# Patient Record
Sex: FEMALE | Race: WHITE | NOT HISPANIC OR LATINO | Employment: STUDENT | URBAN - METROPOLITAN AREA
[De-identification: names, ages, dates, MRNs, and addresses within clinical notes are randomized per-mention and may not be internally consistent; named-entity substitution may affect disease eponyms.]

---
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
## 2024-03-29 PROBLEM — R00.1 BRADYCARDIA: Status: ACTIVE | Noted: 2024-03-29

## 2024-03-29 PROBLEM — R55 NEAR SYNCOPE: Status: ACTIVE | Noted: 2024-03-29

## 2024-10-01 ENCOUNTER — OFFICE VISIT (OUTPATIENT)
Dept: URGENT CARE | Facility: CLINIC | Age: 19
End: 2024-10-01
Payer: OTHER GOVERNMENT

## 2024-10-01 VITALS
HEART RATE: 77 BPM | BODY MASS INDEX: 21.29 KG/M2 | TEMPERATURE: 98 F | WEIGHT: 127.75 LBS | OXYGEN SATURATION: 100 % | DIASTOLIC BLOOD PRESSURE: 82 MMHG | SYSTOLIC BLOOD PRESSURE: 100 MMHG | HEIGHT: 65 IN

## 2024-10-01 DIAGNOSIS — R09.81 NASAL CONGESTION: ICD-10-CM

## 2024-10-01 DIAGNOSIS — R11.2 NAUSEA AND VOMITING, UNSPECIFIED VOMITING TYPE: ICD-10-CM

## 2024-10-01 DIAGNOSIS — B34.9 VIRAL SYNDROME: Primary | ICD-10-CM

## 2024-10-01 DIAGNOSIS — R05.9 COUGH, UNSPECIFIED TYPE: ICD-10-CM

## 2024-10-01 LAB
CTP QC/QA: YES
CTP QC/QA: YES
POC MOLECULAR INFLUENZA A AGN: NEGATIVE
POC MOLECULAR INFLUENZA B AGN: NEGATIVE
SARS-COV-2 AG RESP QL IA.RAPID: NEGATIVE

## 2024-10-01 PROCEDURE — 87811 SARS-COV-2 COVID19 W/OPTIC: CPT | Mod: QW,S$GLB,,

## 2024-10-01 PROCEDURE — 87502 INFLUENZA DNA AMP PROBE: CPT | Mod: QW,S$GLB,,

## 2024-10-01 PROCEDURE — 99203 OFFICE O/P NEW LOW 30 MIN: CPT | Mod: S$GLB,,,

## 2024-10-01 RX ORDER — FLUTICASONE PROPIONATE 50 MCG
1 SPRAY, SUSPENSION (ML) NASAL DAILY
Qty: 9.9 ML | Refills: 0 | Status: SHIPPED | OUTPATIENT
Start: 2024-10-01

## 2024-10-01 RX ORDER — BENZONATATE 200 MG/1
200 CAPSULE ORAL 3 TIMES DAILY PRN
Qty: 30 CAPSULE | Refills: 0 | Status: SHIPPED | OUTPATIENT
Start: 2024-10-01

## 2024-10-01 RX ORDER — ZALEPLON 10 MG/1
10 CAPSULE ORAL NIGHTLY
COMMUNITY

## 2024-10-01 NOTE — PATIENT INSTRUCTIONS
PLEASE READ YOUR DISCHARGE INSTRUCTIONS ENTIRELY AS IT CONTAINS IMPORTANT INFORMATION.    Please drink plenty of fluids.    Please get plenty of rest.    Please return here or go to the Emergency Department for any concerns or worsening of condition.    Please take an over the counter antihistamine medication (Allegra/Claritin/Zyrtec) of your choice as directed for allergy symptoms and/or runny nose and postnasal drip.    Try an over the counter decongestant for sinus pressure/ear pressure, congestion symptoms like Mucinex D or Sudafed or Phenylephrine. You buy this behind the pharmacy counter.    If you do have Hypertension or palpitations, it is safe to take Coricidin HBP for relief of sinus symptoms.    Tylenol or ibuprofen can also be used as directed for pain and fever unless you have an allergy to them or medical condition such as stomach ulcers, kidney or liver disease or blood thinners etc for which you should not be taking these type of medications.     Sore throat recommendations: Warm fluids, warm salt water gargles, throat lozenges, tea, honey, soup, rest, hydration.    Use over the counter Flonase or Nasocort: one spray each nostril twice daily OR two sprays each nostril once daily until nares dry out, unless you have Glaucoma.   Can also supplement with nasal saline rinse.    Sinus rinses DO NOT USE TAP WATER, if you must, water must be at a rolling boil for 1 minute, let it cool, then use.  May use distilled water, or over the counter nasal saline rinses.  Kofi's vapor rub in shower to help open nasal passages.  May use nasal gel to keep passages moisturized.  May use nasal saline sprays during the day for added relief of congestion.   For those who go to the gym, please do not use the sauna or steam room now to clear sinuses.    Cough     Rest and fluids are important  Can use honey with manpreet to soothe your throat    Robitussin or Delsyum for cough suppressant for dry cough.    Mucinex DM or  products containing Guaifenesin or Dextromethorphan for expectorant (wet cough).    Take prescription cough meds (pills) as prescribed; take prescription cough syrup at night as needed for cough.  Do not take both the prescribed cough pills and syrup at the same time or within 6 hours of each other.  Do not take the cough syrup with any other sedative medication as it can can cause drowsiness. Do not operate any heavy machinery, drink or drive while taking the cough syrup.     Please follow up with your primary care doctor or specialist in the next 48-72hrs as needed and if no improvement    If you smoke, please stop smoking.    Please return or see your primary care doctor if you develop new or worsening symptoms.     Lastly, good hand washing and cough hygiene (cough into your elbow) will help prevent the spread of the illness. A general rule is that you are no longer contagious once you have been without a fever for over 24 hours without requiring fever reducing medications.     Please arrange follow up with your primary medical clinic as soon as possible. You must understand that you've received an Urgent Care treatment only and that you may be released before all of your medical problems are known or treated. You, the patient, will arrange for follow up as instructed. If your symptoms worsen or fail to improve you should go to the Emergency Room.

## 2024-10-01 NOTE — PROGRESS NOTES
"Subjective:      Patient ID: Erika Charles is a 19 y.o. female.    Vitals:  height is 5' 5" (1.651 m) and weight is 58 kg (127 lb 12.1 oz). Her temperature is 98 °F (36.7 °C). Her blood pressure is 100/82 and her pulse is 77. Her oxygen saturation is 100%.     Chief Complaint: Cough    Erika Charles is a 19 y.o. female who presents for URI sxs which onset 2 days ago. Associated sxs include chills, congestion, cough, subjective fever, HA, and emesis. Patient denies any SOB, CP, abd pain, v/d, rash, dizziness, or numbness/tingling. No tx tried.     Other  This is a new problem. The current episode started in the past 7 days (Onset Sunday). The problem occurs constantly. The problem has been gradually worsening. Associated symptoms include chills, congestion (nasal congestion), coughing (dry at first, and now productive), fatigue, a fever (felt feverish), headaches, nausea and vomiting. Pertinent negatives include no abdominal pain, change in bowel habit, chest pain, diaphoresis, myalgias, numbness, sore throat or swollen glands. She has tried nothing for the symptoms. The treatment provided no relief.       Constitution: Positive for chills, fatigue and fever (felt feverish). Negative for appetite change and sweating.   HENT:  Positive for congestion (nasal congestion). Negative for ear pain, ear discharge, hearing loss, postnasal drip, sinus pain, sinus pressure, sore throat and trouble swallowing.    Cardiovascular:  Negative for chest pain.   Respiratory:  Positive for cough (dry at first, and now productive). Negative for sputum production and shortness of breath.    Gastrointestinal:  Positive for nausea and vomiting. Negative for abdominal pain and diarrhea.   Musculoskeletal:  Negative for muscle ache.   Neurological:  Positive for headaches. Negative for dizziness, numbness and tingling.      Objective:     Physical Exam   Constitutional: She is oriented to person, place, and time. She appears well-developed. " She is cooperative.  Non-toxic appearance. She does not appear ill. No distress.   HENT:   Head: Normocephalic and atraumatic.   Ears:   Right Ear: Hearing, tympanic membrane, external ear and ear canal normal.   Left Ear: Hearing, tympanic membrane, external ear and ear canal normal.   Nose: Nose normal. No mucosal edema or nasal deformity. No epistaxis. Right sinus exhibits no maxillary sinus tenderness and no frontal sinus tenderness. Left sinus exhibits no maxillary sinus tenderness and no frontal sinus tenderness.   Mouth/Throat: Uvula is midline, oropharynx is clear and moist and mucous membranes are normal. Mucous membranes are moist. No trismus in the jaw. Normal dentition. No uvula swelling. No oropharyngeal exudate, posterior oropharyngeal edema or posterior oropharyngeal erythema.   Eyes: Conjunctivae and lids are normal. No scleral icterus. Extraocular movement intact   Neck: Trachea normal and phonation normal. Neck supple. No edema present. No erythema present. No neck rigidity present.   Cardiovascular: Normal rate, regular rhythm, normal heart sounds and normal pulses.   Pulmonary/Chest: Effort normal and breath sounds normal. No stridor. No respiratory distress. She has no decreased breath sounds. She has no wheezes. She has no rhonchi. She has no rales.   Abdominal: Normal appearance.   Musculoskeletal: Normal range of motion.         General: No deformity. Normal range of motion.   Neurological: She is alert and oriented to person, place, and time. She exhibits normal muscle tone. Coordination normal.   Skin: Skin is warm, dry, intact, not diaphoretic and not pale.   Psychiatric: Her speech is normal and behavior is normal. Judgment and thought content normal.   Nursing note and vitals reviewed.      Assessment:     1. Viral syndrome    2. Nasal congestion    3. Cough, unspecified type    4. Nausea and vomiting, unspecified vomiting type        Results for orders placed or performed in visit on  10/01/24   SARS Coronavirus 2 Antigen, POCT Manual Read    Collection Time: 10/01/24 10:20 AM   Result Value Ref Range    SARS Coronavirus 2 Antigen Negative Negative     Acceptable Yes    POCT Influenza A/B MOLECULAR    Collection Time: 10/01/24 10:29 AM   Result Value Ref Range    POC Molecular Influenza A Ag Negative Negative    POC Molecular Influenza B Ag Negative Negative     Acceptable Yes        Plan:       Viral syndrome    Nasal congestion  -     SARS Coronavirus 2 Antigen, POCT Manual Read  -     POCT Influenza A/B MOLECULAR  -     fluticasone propionate (FLONASE) 50 mcg/actuation nasal spray; 1 spray (50 mcg total) by Each Nostril route once daily.  Dispense: 9.9 mL; Refill: 0    Cough, unspecified type  -     SARS Coronavirus 2 Antigen, POCT Manual Read  -     benzonatate (TESSALON) 200 MG capsule; Take 1 capsule (200 mg total) by mouth 3 (three) times daily as needed for Cough.  Dispense: 30 capsule; Refill: 0    Nausea and vomiting, unspecified vomiting type      Afebrile. VSS. Patient is in NAD.  Discussed negative results with patient.  Educated patient on viral vs bacterial sinus infection/upper respiratory infection.   Advised patient to begin OTC decongestant and Flonase for symptom relief.    Increase fluid intake and plenty of rest.  Tylenol/Ibuprofen (as permitted) as needed for any pain or discomfort.  If symptoms do not resolve, return to clinic for further evaluation.  ER precautions given such as SOB, CP, or fever not resolved with fever-reducing medications.

## 2024-10-11 ENCOUNTER — OFFICE VISIT (OUTPATIENT)
Dept: URGENT CARE | Facility: CLINIC | Age: 19
End: 2024-10-11
Payer: OTHER GOVERNMENT

## 2024-10-11 VITALS
SYSTOLIC BLOOD PRESSURE: 106 MMHG | DIASTOLIC BLOOD PRESSURE: 79 MMHG | HEART RATE: 67 BPM | OXYGEN SATURATION: 100 % | BODY MASS INDEX: 21.29 KG/M2 | WEIGHT: 127.75 LBS | RESPIRATION RATE: 20 BRPM | HEIGHT: 65 IN | TEMPERATURE: 98 F

## 2024-10-11 DIAGNOSIS — J02.9 SORE THROAT: Primary | ICD-10-CM

## 2024-10-11 LAB
CTP QC/QA: YES
MOLECULAR STREP A: NEGATIVE

## 2024-10-11 RX ORDER — METHYLPREDNISOLONE 4 MG/1
TABLET ORAL
Qty: 21 EACH | Refills: 0 | Status: SHIPPED | OUTPATIENT
Start: 2024-10-11 | End: 2024-11-01

## 2024-10-11 NOTE — PROGRESS NOTES
"Subjective:      Patient ID: Erika Charles is a 19 y.o. female.    Vitals:  height is 5' 5" (1.651 m) and weight is 58 kg (127 lb 12.1 oz). Her temperature is 98.2 °F (36.8 °C). Her blood pressure is 106/79 and her pulse is 67. Her respiration is 20 and oxygen saturation is 100%.     Chief Complaint: Sore Throat    Erika Charles is a 19 y.o. female who presents for sore throat which onset 2 days ago. Patient was seen in clinic on 10/1 and dx with viral URI. Rx tessalon perles and flonase. She notes symptoms have not improved. Associated sxs include nasal congestion and cough. Patient denies any fever, chills, SOB, CP, abd pain, n/v/d, rash, dizziness, or numbness/tingling. Prior Tx includes advil and mucinex.     Sore Throat   This is a new problem. The current episode started in the past 7 days (Past 2 days). The problem has been rapidly worsening. Neither (both) side of throat is experiencing more pain than the other. There has been no fever. The pain is at a severity of 4/10. The pain is mild. Associated symptoms include congestion, coughing, headaches and swollen glands. Pertinent negatives include no abdominal pain, diarrhea, drooling, ear discharge, ear pain, hoarse voice, plugged ear sensation, neck pain, shortness of breath, stridor, trouble swallowing or vomiting. She has had no exposure to strep or mono. She has tried nothing for the symptoms. The treatment provided no relief.       Constitution: Negative for appetite change, chills, sweating, fatigue and fever.   HENT:  Positive for congestion and sore throat. Negative for ear pain, ear discharge, hearing loss, drooling, postnasal drip, sinus pain, sinus pressure and trouble swallowing.    Neck: Negative for neck pain.   Cardiovascular:  Negative for chest pain.   Respiratory:  Positive for cough. Negative for sputum production, shortness of breath and stridor.    Gastrointestinal:  Negative for abdominal pain, nausea, vomiting and diarrhea. "   Musculoskeletal:  Negative for muscle ache.   Neurological:  Positive for headaches. Negative for dizziness, numbness and tingling.      Objective:     Physical Exam   Constitutional: She is oriented to person, place, and time. She appears well-developed. She is cooperative.  Non-toxic appearance. She does not appear ill. No distress.   HENT:   Head: Normocephalic and atraumatic.   Ears:   Right Ear: Hearing, tympanic membrane, external ear and ear canal normal.   Left Ear: Hearing, tympanic membrane, external ear and ear canal normal.   Nose: Nose normal. No mucosal edema or nasal deformity. No epistaxis. Right sinus exhibits no maxillary sinus tenderness and no frontal sinus tenderness. Left sinus exhibits no maxillary sinus tenderness and no frontal sinus tenderness.   Mouth/Throat: Uvula is midline and mucous membranes are normal. Mucous membranes are moist. No trismus in the jaw. Normal dentition. No uvula swelling. Posterior oropharyngeal erythema present. No oropharyngeal exudate or posterior oropharyngeal edema. No tonsillar exudate.   Eyes: Conjunctivae and lids are normal. No scleral icterus. Extraocular movement intact   Neck: Trachea normal and phonation normal. Neck supple. No edema present. No erythema present. No neck rigidity present.   Cardiovascular: Normal rate, regular rhythm, normal heart sounds and normal pulses.   Pulmonary/Chest: Effort normal and breath sounds normal. No stridor. No respiratory distress. She has no decreased breath sounds. She has no wheezes. She has no rhonchi. She has no rales.   Abdominal: Normal appearance.   Musculoskeletal: Normal range of motion.         General: No deformity. Normal range of motion.   Lymphadenopathy:     She has no cervical adenopathy.   Neurological: She is alert and oriented to person, place, and time. She exhibits normal muscle tone. Coordination normal.   Skin: Skin is warm, dry, intact, not diaphoretic and not pale.   Psychiatric: Her speech  is normal and behavior is normal. Judgment and thought content normal.   Nursing note and vitals reviewed.      Assessment:     1. Sore throat        Results for orders placed or performed in visit on 10/11/24   POCT Strep A, Molecular    Collection Time: 10/11/24  8:44 AM   Result Value Ref Range    Molecular Strep A, POC Negative Negative     Acceptable Yes        Plan:       Sore throat  -     POCT Strep A, Molecular  -     methylPREDNISolone (MEDROL DOSEPACK) 4 mg tablet; use as directed  Dispense: 21 each; Refill: 0  -     (Magic mouthwash) 1:1:1 diphenhydrAMINE(Benadryl) 12.5mg/5ml liq, aluminum & magnesium hydroxide-simethicone (Maalox), LIDOcaine viscous 2%; Swish and spit 5 mLs every 4 (four) hours as needed (for sore throat).  Dispense: 90 mL; Refill: 0      Afebrile. VSS. Patient is in NAD.  Discussed negative results with patient.  Supportive care for sore throat (salt water gargles, lozenges, chloraseptic spray, cough drops, warm tea, honey, etc.).   Meds: magic mouthwash and medrol dose pack sent to preferred pharmacy. Discussed side effects of steroid use.   Increase fluid intake and plenty of rest.  Tylenol/Ibuprofen (as permitted) as needed for any pain or discomfort.  If symptoms do not resolve, return to clinic for further evaluation.  ER precautions given such as SOB, CP, or fever not resolved with fever-reducing medications.

## 2024-10-24 ENCOUNTER — OFFICE VISIT (OUTPATIENT)
Dept: URGENT CARE | Facility: CLINIC | Age: 19
End: 2024-10-24
Payer: OTHER GOVERNMENT

## 2024-10-24 VITALS
RESPIRATION RATE: 17 BRPM | BODY MASS INDEX: 21.45 KG/M2 | WEIGHT: 128.75 LBS | DIASTOLIC BLOOD PRESSURE: 70 MMHG | HEART RATE: 65 BPM | HEIGHT: 65 IN | SYSTOLIC BLOOD PRESSURE: 108 MMHG | TEMPERATURE: 98 F | OXYGEN SATURATION: 100 %

## 2024-10-24 DIAGNOSIS — H60.332 ACUTE SWIMMER'S EAR OF LEFT SIDE: Primary | ICD-10-CM

## 2024-10-24 PROCEDURE — 99213 OFFICE O/P EST LOW 20 MIN: CPT | Mod: S$GLB,,, | Performed by: PHYSICIAN ASSISTANT

## 2024-10-24 RX ORDER — OFLOXACIN 3 MG/ML
10 SOLUTION AURICULAR (OTIC) DAILY
Qty: 10 ML | Refills: 0 | Status: SHIPPED | OUTPATIENT
Start: 2024-10-24 | End: 2024-10-24

## 2024-10-24 RX ORDER — METHYLPHENIDATE HYDROCHLORIDE 36 MG/1
72 TABLET, EXTENDED RELEASE ORAL EVERY MORNING
COMMUNITY

## 2024-10-24 RX ORDER — OFLOXACIN 3 MG/ML
10 SOLUTION AURICULAR (OTIC) DAILY
Qty: 10 ML | Refills: 0 | Status: SHIPPED | OUTPATIENT
Start: 2024-10-24 | End: 2024-10-31

## 2024-10-24 RX ORDER — VIBEGRON 75 MG/1
1 TABLET, FILM COATED ORAL DAILY
COMMUNITY

## 2024-10-24 NOTE — PATIENT INSTRUCTIONS
Ear drops to affected ear 10 drops daily. Lie on your side and allow drops to soak in 15-20 minutes, and then apply cotton ball to external ear as they will drain out when you sit up. Avoid submerging under water for 1 week and until pain improved. Cotton ball to ear when showering or bathing. Ibuprofen every 6 hours as needed for pain. Follow up with your doctor if pain, fever, or drainage persists after completion of drops.

## 2024-10-24 NOTE — LETTER
October 24, 2024      [facility] [address] [place] [address]  Phone: [phone]  Fax: [phone]       Patient: Erika Charles   YOB: 2005  Date of Visit: 10/24/2024    To Whom It May Concern:    [place] Charles  was at [facility] on 10/24/2024. The patient may return to work/school on 10/25/24 with no restrictions. If you have any questions or concerns, or if I can be of further assistance, please do not hesitate to contact me.    Sincerely,    Elizabeth Barnes PA-C  [facility]

## 2024-10-24 NOTE — PROGRESS NOTES
"Subjective:      Patient ID: Erika Charles is a 19 y.o. female.    Vitals:  height is 5' 5" (1.651 m) and weight is 58.4 kg (128 lb 12 oz). Her temperature is 98.2 °F (36.8 °C). Her blood pressure is 108/70 and her pulse is 65. Her respiration is 17 and oxygen saturation is 100%.     Chief Complaint: Otalgia    Patient presents with ear pain. symptons started 3 days ago. L ear pain. No fever or drainage. Recent recovery from pneumonia 2 weeks ago. Denies any CCC, CP, SOB, wheezing currently       Otalgia   There is pain in the left ear. This is a new problem. Episode onset: 3 days ago. The problem occurs constantly. The problem has been unchanged. There has been no fever. The pain is at a severity of 2/10. The patient is experiencing no pain. Associated symptoms include headaches and hearing loss. Pertinent negatives include no abdominal pain, coughing, diarrhea, ear discharge, neck pain, rash, rhinorrhea, sore throat or vomiting. She has tried antibiotics, ear drops and acetaminophen for the symptoms. The treatment provided mild relief. There is no history of a chronic ear infection, hearing loss or a tympanostomy tube.       Constitution: Negative for sweating, fatigue and fever.   HENT:  Positive for ear pain and hearing loss. Negative for ear discharge, foreign body in ear, tinnitus and sore throat.    Neck: Negative for neck pain.   Respiratory:  Negative for cough, sputum production, shortness of breath and wheezing.    Gastrointestinal:  Negative for abdominal pain, vomiting and diarrhea.   Skin:  Negative for rash.   Neurological:  Positive for headaches.      Objective:     Physical Exam   Constitutional: She is oriented to person, place, and time. She appears well-developed. She is cooperative.  Non-toxic appearance. She does not appear ill. No distress.   HENT:   Head: Normocephalic and atraumatic.   Ears:   Right Ear: Hearing, tympanic membrane, external ear and ear canal normal.   Left Ear: There is " swelling and tenderness. No no drainage. Tympanic membrane is not perforated and not erythematous. A middle ear effusion (serous fluid) is present.   Nose: Nose normal. No mucosal edema, rhinorrhea or nasal deformity. No epistaxis. Right sinus exhibits no maxillary sinus tenderness and no frontal sinus tenderness. Left sinus exhibits no maxillary sinus tenderness and no frontal sinus tenderness.   Mouth/Throat: Uvula is midline, oropharynx is clear and moist and mucous membranes are normal. No trismus in the jaw. Normal dentition. No uvula swelling. No oropharyngeal exudate, posterior oropharyngeal edema or posterior oropharyngeal erythema.   Left ear pain with tragus manipulation      Comments: Left ear pain with tragus manipulation  Eyes: Conjunctivae and lids are normal. No scleral icterus.   Neck: Trachea normal and phonation normal. Neck supple. No edema present. No erythema present. No neck rigidity present.   Cardiovascular: Normal rate, regular rhythm, normal heart sounds and normal pulses.   Pulmonary/Chest: Effort normal and breath sounds normal. No respiratory distress. She has no decreased breath sounds. She has no rhonchi.   Abdominal: Normal appearance.   Musculoskeletal: Normal range of motion.         General: No deformity. Normal range of motion.   Neurological: She is alert and oriented to person, place, and time. She exhibits normal muscle tone. Coordination normal.   Skin: Skin is warm, dry, intact, not diaphoretic and not pale.   Psychiatric: Her speech is normal and behavior is normal. Judgment and thought content normal.   Nursing note and vitals reviewed.      Assessment:     1. Acute swimmer's ear of left side        Plan:       Acute swimmer's ear of left side  -     Discontinue: ofloxacin (FLOXIN) 0.3 % otic solution; Place 10 drops into the left ear once daily. for 7 days  Dispense: 10 mL; Refill: 0  -     ofloxacin (FLOXIN) 0.3 % otic solution; Place 10 drops into the left ear once  daily. for 7 days  Dispense: 10 mL; Refill: 0    Elizabeth Barnes PA-C  [facility]       Patient Instructions   Ear drops to affected ear 10 drops daily. Lie on your side and allow drops to soak in 15-20 minutes, and then apply cotton ball to external ear as they will drain out when you sit up. Avoid submerging under water for 1 week and until pain improved. Cotton ball to ear when showering or bathing. Ibuprofen every 6 hours as needed for pain. Follow up with your doctor if pain, fever, or drainage persists after completion of drops.

## 2024-11-14 PROBLEM — R07.89 OTHER CHEST PAIN: Status: ACTIVE | Noted: 2024-11-14

## 2025-03-11 ENCOUNTER — OFFICE VISIT (OUTPATIENT)
Dept: URGENT CARE | Facility: CLINIC | Age: 20
End: 2025-03-11
Payer: OTHER GOVERNMENT

## 2025-03-11 VITALS
OXYGEN SATURATION: 99 % | WEIGHT: 132.69 LBS | RESPIRATION RATE: 18 BRPM | HEART RATE: 85 BPM | TEMPERATURE: 98 F | SYSTOLIC BLOOD PRESSURE: 117 MMHG | DIASTOLIC BLOOD PRESSURE: 79 MMHG | BODY MASS INDEX: 22.11 KG/M2 | HEIGHT: 65 IN

## 2025-03-11 DIAGNOSIS — R30.0 DYSURIA: ICD-10-CM

## 2025-03-11 DIAGNOSIS — R31.9 URINARY TRACT INFECTION WITH HEMATURIA, SITE UNSPECIFIED: Primary | ICD-10-CM

## 2025-03-11 DIAGNOSIS — N39.0 URINARY TRACT INFECTION WITH HEMATURIA, SITE UNSPECIFIED: Primary | ICD-10-CM

## 2025-03-11 LAB
B-HCG UR QL: NEGATIVE
BILIRUBIN, UA POC OHS: NEGATIVE
BLOOD, UA POC OHS: ABNORMAL
CLARITY, UA POC OHS: CLEAR
COLOR, UA POC OHS: YELLOW
CTP QC/QA: YES
GLUCOSE, UA POC OHS: NEGATIVE
KETONES, UA POC OHS: NEGATIVE
LEUKOCYTES, UA POC OHS: ABNORMAL
NITRITE, UA POC OHS: NEGATIVE
PH, UA POC OHS: 5
PROTEIN, UA POC OHS: ABNORMAL
SPECIFIC GRAVITY, UA POC OHS: >=1.03
UROBILINOGEN, UA POC OHS: 0.2

## 2025-03-11 PROCEDURE — 81025 URINE PREGNANCY TEST: CPT | Mod: S$GLB,,, | Performed by: FAMILY MEDICINE

## 2025-03-11 PROCEDURE — 81003 URINALYSIS AUTO W/O SCOPE: CPT | Mod: QW,S$GLB,, | Performed by: FAMILY MEDICINE

## 2025-03-11 PROCEDURE — 99213 OFFICE O/P EST LOW 20 MIN: CPT | Mod: S$GLB,,, | Performed by: FAMILY MEDICINE

## 2025-03-11 PROCEDURE — 87086 URINE CULTURE/COLONY COUNT: CPT | Performed by: FAMILY MEDICINE

## 2025-03-11 PROCEDURE — 87088 URINE BACTERIA CULTURE: CPT | Performed by: FAMILY MEDICINE

## 2025-03-11 PROCEDURE — 87186 SC STD MICRODIL/AGAR DIL: CPT | Performed by: FAMILY MEDICINE

## 2025-03-11 RX ORDER — NITROFURANTOIN 25; 75 MG/1; MG/1
100 CAPSULE ORAL 2 TIMES DAILY
Qty: 10 CAPSULE | Refills: 0 | Status: SHIPPED | OUTPATIENT
Start: 2025-03-11 | End: 2025-03-16

## 2025-03-11 NOTE — PROGRESS NOTES
"Subjective:      Patient ID: Erika Charles is a 19 y.o. female.    Vitals:  height is 5' 5" (1.651 m) and weight is 60.2 kg (132 lb 11.5 oz). Her oral temperature is 98.1 °F (36.7 °C). Her blood pressure is 117/79 and her pulse is 85. Her respiration is 18 and oxygen saturation is 99%.     Chief Complaint: Dysuria    19 y.o. pt came in today with uti symptoms (dysuria, hesitancy, urgency, and frequency) that started approx 4 days ago; pt took Azo Saturday which provided mild relief, Hx of recurrent UTIs.     Dysuria   This is a new problem. The current episode started in the past 7 days. The problem occurs every urination. The problem has been gradually worsening. The quality of the pain is described as aching and burning. The pain is at a severity of 3/10. The pain is mild. There has been no fever. She is Sexually active. There is No history of pyelonephritis. Associated symptoms include frequency, hesitancy and urgency. Pertinent negatives include no behavior changes, chills, discharge, flank pain, hematuria, nausea, possible pregnancy, sweats, vomiting, weight loss, bubble bath use, constipation, rash or withholding. Treatments tried: azo. The treatment provided mild relief. Her past medical history is significant for recurrent UTIs.       Constitution: Negative. Negative for chills.   Cardiovascular: Negative.    Respiratory: Negative.     Gastrointestinal: Negative.  Negative for nausea, vomiting and constipation.   Genitourinary:  Positive for dysuria, frequency and urgency. Negative for flank pain and hematuria.   Skin:  Negative for rash.   Neurological: Negative.       Objective:     Physical Exam   Constitutional: She is oriented to person, place, and time. No distress.   HENT:   Ears:   Right Ear: External ear normal.   Left Ear: External ear normal.   Nose: Nose normal.   Mouth/Throat: Mucous membranes are moist. Oropharynx is clear.   Eyes: Conjunctivae are normal.   Neck: Neck supple.   Cardiovascular: " Normal rate, regular rhythm, normal heart sounds and normal pulses.   Pulmonary/Chest: Effort normal and breath sounds normal.   Abdominal: Normal appearance. She exhibits no distension. Soft. There is no abdominal tenderness. There is no left CVA tenderness and no right CVA tenderness.   Neurological: no focal deficit. She is alert and oriented to person, place, and time.   Skin: Skin is warm.   Psychiatric: Her behavior is normal. Mood, judgment and thought content normal.   Nursing note and vitals reviewed.      Assessment:     1. Urinary tract infection with hematuria, site unspecified    2. Dysuria        Plan:       Urinary tract infection with hematuria, site unspecified  -     Urine Culture High Risk    Dysuria  -     POCT Urinalysis(Instrument)  -     POCT urine pregnancy    Other orders  -     nitrofurantoin, macrocrystal-monohydrate, (MACROBID) 100 MG capsule; Take 1 capsule (100 mg total) by mouth 2 (two) times daily. for 5 days  Dispense: 10 capsule; Refill: 0      Review of patient's allergies indicates:  No Known Allergies    SUMMARY: see hpi. Sending out culture since recurrent UTI history. Supportive measures. Stay hydrated. Handout on Cystitis given as well. Immediate medical provider evaluation if symptoms continue or worsen. See below.     Patient Instructions   Thank you for letting our team take care of you today.   Your diagnosis is urinary tract infection (acute cystitis).   A sample of the urine will be sent to the lab for culture. Our office will contact you with the result once it returns. If there is any resistance or need to change the medication, it will be done at that time.    An antibiotic is being prescribed. Macrobid twice a day for five days.  Azo can be used for discomfort/burning. This can turn your urine orange.   Like any medication, side effects may occur. If any problems occur after starting the medications, as an example, joint/tendon pain, swelling, itching, chest pain,  hold on taking the medication and notify the pharmacy and our office.   Stay hydrated.   Followup with your primary care provider.   Followup here as needed.   Immediate medical provider evaluation/ER if any worsening.

## 2025-03-11 NOTE — PATIENT INSTRUCTIONS
Thank you for letting our team take care of you today.   Your diagnosis is urinary tract infection (acute cystitis).   A sample of the urine will be sent to the lab for culture. Our office will contact you with the result once it returns. If there is any resistance or need to change the medication, it will be done at that time.    An antibiotic is being prescribed. Macrobid twice a day for five days.  Azo can be used for discomfort/burning. This can turn your urine orange.   Like any medication, side effects may occur. If any problems occur after starting the medications, as an example, joint/tendon pain, swelling, itching, chest pain, hold on taking the medication and notify the pharmacy and our office.   Stay hydrated.   Followup with your primary care provider.   Followup here as needed.   Immediate medical provider evaluation/ER if any worsening.

## 2025-03-13 ENCOUNTER — RESULTS FOLLOW-UP (OUTPATIENT)
Dept: URGENT CARE | Facility: CLINIC | Age: 20
End: 2025-03-13

## 2025-03-14 LAB — BACTERIA UR CULT: ABNORMAL

## 2025-03-21 ENCOUNTER — OFFICE VISIT (OUTPATIENT)
Dept: URGENT CARE | Facility: CLINIC | Age: 20
End: 2025-03-21
Payer: OTHER GOVERNMENT

## 2025-03-21 VITALS
HEIGHT: 65 IN | DIASTOLIC BLOOD PRESSURE: 69 MMHG | RESPIRATION RATE: 16 BRPM | WEIGHT: 130.75 LBS | HEART RATE: 60 BPM | OXYGEN SATURATION: 99 % | BODY MASS INDEX: 21.79 KG/M2 | SYSTOLIC BLOOD PRESSURE: 107 MMHG | TEMPERATURE: 98 F

## 2025-03-21 DIAGNOSIS — D84.89 PRIMARY IMMUNODEFICIENCY DISORDER: ICD-10-CM

## 2025-03-21 DIAGNOSIS — L50.9 HIVES: Primary | ICD-10-CM

## 2025-03-21 DIAGNOSIS — K51.919 ULCERATIVE COLITIS WITH COMPLICATION, UNSPECIFIED LOCATION: ICD-10-CM

## 2025-03-21 RX ORDER — PREDNISONE 20 MG/1
20 TABLET ORAL DAILY
Qty: 5 TABLET | Refills: 0 | Status: SHIPPED | OUTPATIENT
Start: 2025-03-21 | End: 2025-03-26

## 2025-03-21 RX ORDER — CETIRIZINE HYDROCHLORIDE 10 MG/1
10 TABLET ORAL DAILY
Qty: 30 TABLET | Refills: 0 | Status: SHIPPED | OUTPATIENT
Start: 2025-03-21

## 2025-03-21 NOTE — PATIENT INSTRUCTIONS
Zyrtec in am  Benadryl or vistaril in pm   Continue gentle body wash  Continue washing laundry in free and clear

## 2025-03-21 NOTE — PROGRESS NOTES
"Subjective:      Patient ID: Erika Charles is a 19 y.o. female.    Vitals:  height is 5' 5" (1.651 m) and weight is 59.3 kg (130 lb 11.7 oz). Her oral temperature is 98.4 °F (36.9 °C). Her blood pressure is 107/69 and her pulse is 60. Her respiration is 16 and oxygen saturation is 99%.     Chief Complaint: Rash    Patient presents with itchiness all over body and a pain in her lower abdomen that has been present for the last 3 weeks. Patient states that the itchiness will localize in certain areas randomly and a points appears to be hives. . Onset of itchiness was around 3/13/2025. Patient was seen here on 3/11/2025 for an UTI. Patient was prescribed nitrofurantoin. Rash started 2 days after she started the antibiotics. OTC - none. No new foods, no other new meds, no recent travel. Has allergy/immunology in [facility] for primary immune deficiency and ulcerative colitis.     Rash  This is a new problem. The current episode started in the past 7 days. The problem has been waxing and waning since onset. The affected locations include the abdomen, chest, neck, face, back, torso, left arm, right arm, left elbow, right elbow, right hand, left hand, left wrist, right wrist, left fingers, right fingers, left hip, left buttock, right buttock, right hip, right upper leg, right lower leg, left upper leg, left lower leg, left ankle, right ankle, right foot, left foot, right toes, left toes, right shoulder, left shoulder, head, scalp, groin, left eye, right eye, left axilla and right axilla. The rash is characterized by itchiness, swelling, pain and redness. She was exposed to nothing. Past treatments include nothing. The treatment provided no relief.       Skin:  Positive for rash.      Objective:     Physical Exam   Constitutional: She is oriented to person, place, and time. She appears well-developed. She is cooperative.  Non-toxic appearance. She does not appear ill. No distress.   HENT:   Head: Normocephalic and atraumatic. "   Ears:   Right Ear: Hearing, external ear and ear canal normal.   Left Ear: Hearing, external ear and ear canal normal.   Nose: Nose normal. No mucosal edema, rhinorrhea or nasal deformity. No epistaxis. Right sinus exhibits no maxillary sinus tenderness and no frontal sinus tenderness. Left sinus exhibits no maxillary sinus tenderness and no frontal sinus tenderness.   Mouth/Throat: Uvula is midline, oropharynx is clear and moist and mucous membranes are normal. No trismus in the jaw. Normal dentition. No uvula swelling. No oropharyngeal exudate, posterior oropharyngeal edema or posterior oropharyngeal erythema.   Eyes: Conjunctivae and lids are normal. No scleral icterus.   Neck: Trachea normal and phonation normal. Neck supple. No edema present. No erythema present. No neck rigidity present.   Cardiovascular: Normal rate, regular rhythm, normal heart sounds and normal pulses.   Pulmonary/Chest: Effort normal and breath sounds normal. No respiratory distress. She has no decreased breath sounds. She has no rhonchi.   Abdominal: Normal appearance.   Musculoskeletal: Normal range of motion.         General: No deformity. Normal range of motion.   Neurological: She is alert and oriented to person, place, and time. She exhibits normal muscle tone. Coordination normal.   Skin: Skin is warm, dry, intact, not diaphoretic and not pale.         Comments: No visible rash   Psychiatric: Her speech is normal and behavior is normal. Judgment and thought content normal.   Nursing note and vitals reviewed.      Assessment:     1. Hives    2. Primary immunodeficiency disorder    3. Ulcerative colitis with complication, unspecified location        Plan:       Hives  -     predniSONE (DELTASONE) 20 MG tablet; Take 1 tablet (20 mg total) by mouth once daily. for 5 days  Dispense: 5 tablet; Refill: 0  -     cetirizine (ZYRTEC) 10 MG tablet; Take 1 tablet (10 mg total) by mouth once daily.  Dispense: 30 tablet; Refill: 0  Patient  Instructions   Zyrtec in am  Benadryl or vistaril in pm   Continue gentle body wash  Continue washing laundry in free and clear      Primary immunodeficiency disorder  Stable. Followed by outside allergy/immunologist in [place]. Due to start a new med next week for this.   This chronic condition affected today's medical decision making and treatment plan.       Ulcerative colitis with complication, unspecified location  Take prednisone with food  Patient immunocompromised   Followed by gastro and allergy/immunology  This chronic condition affected today's medical decision making and treatment plan.